# Patient Record
Sex: FEMALE | HISPANIC OR LATINO | Employment: UNEMPLOYED | URBAN - METROPOLITAN AREA
[De-identification: names, ages, dates, MRNs, and addresses within clinical notes are randomized per-mention and may not be internally consistent; named-entity substitution may affect disease eponyms.]

---

## 2020-01-17 ENCOUNTER — HOSPITAL ENCOUNTER (OUTPATIENT)
Facility: MEDICAL CENTER | Age: 40
End: 2020-01-17

## 2020-01-18 ENCOUNTER — HOSPITAL ENCOUNTER (EMERGENCY)
Facility: MEDICAL CENTER | Age: 40
End: 2020-01-18

## 2020-01-18 ENCOUNTER — HOSPITAL ENCOUNTER (OUTPATIENT)
Facility: MEDICAL CENTER | Age: 40
LOS: 1 days | End: 2020-01-19
Attending: HOSPITALIST | Admitting: HOSPITALIST

## 2020-01-18 ENCOUNTER — HOSPITAL ENCOUNTER (OUTPATIENT)
Dept: RADIOLOGY | Facility: MEDICAL CENTER | Age: 40
End: 2020-01-18

## 2020-01-18 ENCOUNTER — APPOINTMENT (OUTPATIENT)
Dept: RADIOLOGY | Facility: MEDICAL CENTER | Age: 40
End: 2020-01-18
Attending: HOSPITALIST

## 2020-01-18 DIAGNOSIS — I63.011 CEREBROVASCULAR ACCIDENT (CVA) DUE TO THROMBOSIS OF RIGHT VERTEBRAL ARTERY (HCC): ICD-10-CM

## 2020-01-18 DIAGNOSIS — G43.109 COMPLICATED MIGRAINE: ICD-10-CM

## 2020-01-18 PROBLEM — R53.1 LEFT-SIDED WEAKNESS: Status: ACTIVE | Noted: 2020-01-18

## 2020-01-18 LAB
EST. AVERAGE GLUCOSE BLD GHB EST-MCNC: 103 MG/DL
HBA1C MFR BLD: 5.2 % (ref 0–5.6)

## 2020-01-18 PROCEDURE — 36415 COLL VENOUS BLD VENIPUNCTURE: CPT

## 2020-01-18 PROCEDURE — 97162 PT EVAL MOD COMPLEX 30 MIN: CPT

## 2020-01-18 PROCEDURE — 700117 HCHG RX CONTRAST REV CODE 255: Performed by: HOSPITALIST

## 2020-01-18 PROCEDURE — 96366 THER/PROPH/DIAG IV INF ADDON: CPT

## 2020-01-18 PROCEDURE — 700111 HCHG RX REV CODE 636 W/ 250 OVERRIDE (IP): Performed by: PSYCHIATRY & NEUROLOGY

## 2020-01-18 PROCEDURE — 97165 OT EVAL LOW COMPLEX 30 MIN: CPT

## 2020-01-18 PROCEDURE — 83036 HEMOGLOBIN GLYCOSYLATED A1C: CPT

## 2020-01-18 PROCEDURE — 99244 OFF/OP CNSLTJ NEW/EST MOD 40: CPT | Performed by: PSYCHIATRY & NEUROLOGY

## 2020-01-18 PROCEDURE — 700111 HCHG RX REV CODE 636 W/ 250 OVERRIDE (IP): Performed by: HOSPITALIST

## 2020-01-18 PROCEDURE — 700102 HCHG RX REV CODE 250 W/ 637 OVERRIDE(OP): Performed by: PSYCHIATRY & NEUROLOGY

## 2020-01-18 PROCEDURE — 99358 PROLONG SERVICE W/O CONTACT: CPT | Performed by: HOSPITALIST

## 2020-01-18 PROCEDURE — 99220 PR INITIAL OBSERVATION CARE,LEVL III: CPT | Performed by: HOSPITALIST

## 2020-01-18 PROCEDURE — 92610 EVALUATE SWALLOWING FUNCTION: CPT

## 2020-01-18 PROCEDURE — 96375 TX/PRO/DX INJ NEW DRUG ADDON: CPT

## 2020-01-18 PROCEDURE — A9270 NON-COVERED ITEM OR SERVICE: HCPCS | Performed by: HOSPITALIST

## 2020-01-18 PROCEDURE — 93880 EXTRACRANIAL BILAT STUDY: CPT

## 2020-01-18 PROCEDURE — G0378 HOSPITAL OBSERVATION PER HR: HCPCS

## 2020-01-18 PROCEDURE — 70551 MRI BRAIN STEM W/O DYE: CPT

## 2020-01-18 PROCEDURE — 700102 HCHG RX REV CODE 250 W/ 637 OVERRIDE(OP): Performed by: HOSPITALIST

## 2020-01-18 PROCEDURE — 96365 THER/PROPH/DIAG IV INF INIT: CPT

## 2020-01-18 PROCEDURE — 70496 CT ANGIOGRAPHY HEAD: CPT

## 2020-01-18 PROCEDURE — 70498 CT ANGIOGRAPHY NECK: CPT

## 2020-01-18 PROCEDURE — A9270 NON-COVERED ITEM OR SERVICE: HCPCS | Performed by: PSYCHIATRY & NEUROLOGY

## 2020-01-18 RX ORDER — ATORVASTATIN CALCIUM 80 MG/1
80 TABLET, FILM COATED ORAL EVERY EVENING
Status: DISCONTINUED | OUTPATIENT
Start: 2020-01-18 | End: 2020-01-18

## 2020-01-18 RX ORDER — METOCLOPRAMIDE HYDROCHLORIDE 5 MG/ML
10 INJECTION INTRAMUSCULAR; INTRAVENOUS ONCE
Status: COMPLETED | OUTPATIENT
Start: 2020-01-18 | End: 2020-01-18

## 2020-01-18 RX ORDER — BISACODYL 10 MG
10 SUPPOSITORY, RECTAL RECTAL
Status: DISCONTINUED | OUTPATIENT
Start: 2020-01-18 | End: 2020-01-19 | Stop reason: HOSPADM

## 2020-01-18 RX ORDER — ASPIRIN 325 MG
325 TABLET ORAL DAILY
Status: DISCONTINUED | OUTPATIENT
Start: 2020-01-18 | End: 2020-01-19 | Stop reason: HOSPADM

## 2020-01-18 RX ORDER — PREDNISONE 50 MG/1
50 TABLET ORAL DAILY
Status: DISCONTINUED | OUTPATIENT
Start: 2020-01-19 | End: 2020-01-19

## 2020-01-18 RX ORDER — ASPIRIN 325 MG
325 TABLET ORAL DAILY
Status: DISCONTINUED | OUTPATIENT
Start: 2020-01-18 | End: 2020-01-18

## 2020-01-18 RX ORDER — DEXAMETHASONE SODIUM PHOSPHATE 4 MG/ML
10 INJECTION, SOLUTION INTRA-ARTICULAR; INTRALESIONAL; INTRAMUSCULAR; INTRAVENOUS; SOFT TISSUE ONCE
Status: COMPLETED | OUTPATIENT
Start: 2020-01-18 | End: 2020-01-18

## 2020-01-18 RX ORDER — ASPIRIN 81 MG/1
324 TABLET, CHEWABLE ORAL DAILY
Status: DISCONTINUED | OUTPATIENT
Start: 2020-01-18 | End: 2020-01-19 | Stop reason: HOSPADM

## 2020-01-18 RX ORDER — ASPIRIN 300 MG/1
300 SUPPOSITORY RECTAL DAILY
Status: DISCONTINUED | OUTPATIENT
Start: 2020-01-18 | End: 2020-01-18

## 2020-01-18 RX ORDER — LABETALOL HYDROCHLORIDE 5 MG/ML
10 INJECTION, SOLUTION INTRAVENOUS EVERY 4 HOURS PRN
Status: DISCONTINUED | OUTPATIENT
Start: 2020-01-18 | End: 2020-01-19 | Stop reason: HOSPADM

## 2020-01-18 RX ORDER — ATORVASTATIN CALCIUM 40 MG/1
40 TABLET, FILM COATED ORAL EVERY EVENING
Status: DISCONTINUED | OUTPATIENT
Start: 2020-01-18 | End: 2020-01-18

## 2020-01-18 RX ORDER — ASPIRIN 81 MG/1
324 TABLET, CHEWABLE ORAL DAILY
Status: DISCONTINUED | OUTPATIENT
Start: 2020-01-18 | End: 2020-01-18

## 2020-01-18 RX ORDER — AMOXICILLIN 250 MG
2 CAPSULE ORAL 2 TIMES DAILY
Status: DISCONTINUED | OUTPATIENT
Start: 2020-01-18 | End: 2020-01-19 | Stop reason: HOSPADM

## 2020-01-18 RX ORDER — ATORVASTATIN CALCIUM 40 MG/1
40 TABLET, FILM COATED ORAL EVERY EVENING
Status: DISCONTINUED | OUTPATIENT
Start: 2020-01-19 | End: 2020-01-19 | Stop reason: HOSPADM

## 2020-01-18 RX ORDER — HYDRALAZINE HYDROCHLORIDE 20 MG/ML
10 INJECTION INTRAMUSCULAR; INTRAVENOUS
Status: DISCONTINUED | OUTPATIENT
Start: 2020-01-18 | End: 2020-01-19 | Stop reason: HOSPADM

## 2020-01-18 RX ORDER — PREDNISONE 1 MG/1
1 TABLET ORAL DAILY
Status: DISCONTINUED | OUTPATIENT
Start: 2020-01-19 | End: 2020-01-18

## 2020-01-18 RX ORDER — MAGNESIUM SULFATE HEPTAHYDRATE 40 MG/ML
2 INJECTION, SOLUTION INTRAVENOUS ONCE
Status: COMPLETED | OUTPATIENT
Start: 2020-01-18 | End: 2020-01-18

## 2020-01-18 RX ORDER — AMITRIPTYLINE HYDROCHLORIDE 10 MG/1
10 TABLET, FILM COATED ORAL EVERY EVENING
Status: DISCONTINUED | OUTPATIENT
Start: 2020-01-18 | End: 2020-01-19 | Stop reason: HOSPADM

## 2020-01-18 RX ORDER — POLYETHYLENE GLYCOL 3350 17 G/17G
1 POWDER, FOR SOLUTION ORAL
Status: DISCONTINUED | OUTPATIENT
Start: 2020-01-18 | End: 2020-01-19 | Stop reason: HOSPADM

## 2020-01-18 RX ORDER — ASPIRIN 300 MG/1
300 SUPPOSITORY RECTAL DAILY
Status: DISCONTINUED | OUTPATIENT
Start: 2020-01-18 | End: 2020-01-19 | Stop reason: HOSPADM

## 2020-01-18 RX ORDER — KETOROLAC TROMETHAMINE 30 MG/ML
30 INJECTION, SOLUTION INTRAMUSCULAR; INTRAVENOUS EVERY 6 HOURS
Status: DISCONTINUED | OUTPATIENT
Start: 2020-01-18 | End: 2020-01-18

## 2020-01-18 RX ADMIN — METOCLOPRAMIDE 10 MG: 5 INJECTION, SOLUTION INTRAMUSCULAR; INTRAVENOUS at 11:27

## 2020-01-18 RX ADMIN — DEXAMETHASONE SODIUM PHOSPHATE 10 MG: 4 INJECTION, SOLUTION INTRA-ARTICULAR; INTRALESIONAL; INTRAMUSCULAR; INTRAVENOUS; SOFT TISSUE at 11:17

## 2020-01-18 RX ADMIN — ATORVASTATIN CALCIUM 80 MG: 80 TABLET, FILM COATED ORAL at 16:56

## 2020-01-18 RX ADMIN — AMITRIPTYLINE HYDROCHLORIDE 10 MG: 10 TABLET, FILM COATED ORAL at 19:34

## 2020-01-18 RX ADMIN — ASPIRIN 300 MG: 300 SUPPOSITORY RECTAL at 12:01

## 2020-01-18 RX ADMIN — IOHEXOL 80 ML: 350 INJECTION, SOLUTION INTRAVENOUS at 12:43

## 2020-01-18 RX ADMIN — PREDNISONE 60 MG: 50 TABLET ORAL at 19:34

## 2020-01-18 RX ADMIN — MAGNESIUM SULFATE 2 G: 2 INJECTION INTRAVENOUS at 11:31

## 2020-01-18 SDOH — HEALTH STABILITY: MENTAL HEALTH: HOW OFTEN DO YOU HAVE A DRINK CONTAINING ALCOHOL?: 2-4 TIMES A MONTH

## 2020-01-18 SDOH — HEALTH STABILITY: MENTAL HEALTH: HOW OFTEN DO YOU HAVE 6 OR MORE DRINKS ON ONE OCCASION?: NEVER

## 2020-01-18 SDOH — ECONOMIC STABILITY: TRANSPORTATION INSECURITY
IN THE PAST 12 MONTHS, HAS LACK OF TRANSPORTATION KEPT YOU FROM MEETINGS, WORK, OR FROM GETTING THINGS NEEDED FOR DAILY LIVING?: PATIENT DECLINED

## 2020-01-18 SDOH — HEALTH STABILITY: MENTAL HEALTH: HOW MANY STANDARD DRINKS CONTAINING ALCOHOL DO YOU HAVE ON A TYPICAL DAY?: 1 OR 2

## 2020-01-18 SDOH — ECONOMIC STABILITY: FOOD INSECURITY: WITHIN THE PAST 12 MONTHS, THE FOOD YOU BOUGHT JUST DIDN'T LAST AND YOU DIDN'T HAVE MONEY TO GET MORE.: PATIENT DECLINED

## 2020-01-18 SDOH — ECONOMIC STABILITY: FOOD INSECURITY: WITHIN THE PAST 12 MONTHS, YOU WORRIED THAT YOUR FOOD WOULD RUN OUT BEFORE YOU GOT MONEY TO BUY MORE.: PATIENT DECLINED

## 2020-01-18 SDOH — ECONOMIC STABILITY: TRANSPORTATION INSECURITY
IN THE PAST 12 MONTHS, HAS THE LACK OF TRANSPORTATION KEPT YOU FROM MEDICAL APPOINTMENTS OR FROM GETTING MEDICATIONS?: PATIENT DECLINED

## 2020-01-18 ASSESSMENT — ACTIVITIES OF DAILY LIVING (ADL): TOILETING: INDEPENDENT

## 2020-01-18 ASSESSMENT — COGNITIVE AND FUNCTIONAL STATUS - GENERAL
TOILETING: A LITTLE
CLIMB 3 TO 5 STEPS WITH RAILING: A LOT
SUGGESTED CMS G CODE MODIFIER DAILY ACTIVITY: CK
DRESSING REGULAR UPPER BODY CLOTHING: A LITTLE
WALKING IN HOSPITAL ROOM: A LITTLE
PERSONAL GROOMING: A LITTLE
DAILY ACTIVITIY SCORE: 19
WALKING IN HOSPITAL ROOM: A LITTLE
CLIMB 3 TO 5 STEPS WITH RAILING: A LOT
TURNING FROM BACK TO SIDE WHILE IN FLAT BAD: A LITTLE
DAILY ACTIVITIY SCORE: 19
SUGGESTED CMS G CODE MODIFIER MOBILITY: CK
MOVING FROM LYING ON BACK TO SITTING ON SIDE OF FLAT BED: A LITTLE
STANDING UP FROM CHAIR USING ARMS: A LITTLE
DRESSING REGULAR LOWER BODY CLOTHING: A LOT
HELP NEEDED FOR BATHING: A LITTLE
HELP NEEDED FOR BATHING: A LITTLE
MOVING TO AND FROM BED TO CHAIR: A LITTLE
MOVING FROM LYING ON BACK TO SITTING ON SIDE OF FLAT BED: A LITTLE
STANDING UP FROM CHAIR USING ARMS: A LITTLE
MOBILITY SCORE: 17
SUGGESTED CMS G CODE MODIFIER DAILY ACTIVITY: CK
DRESSING REGULAR LOWER BODY CLOTHING: A LITTLE
MOVING TO AND FROM BED TO CHAIR: A LITTLE
TOILETING: A LITTLE
DRESSING REGULAR UPPER BODY CLOTHING: A LITTLE
SUGGESTED CMS G CODE MODIFIER MOBILITY: CK
MOBILITY SCORE: 18

## 2020-01-18 ASSESSMENT — LIFESTYLE VARIABLES
CONSUMPTION TOTAL: NEGATIVE
HAVE PEOPLE ANNOYED YOU BY CRITICIZING YOUR DRINKING: NO
DOES PATIENT WANT TO STOP DRINKING: NO
TOTAL SCORE: 0
TOTAL SCORE: 0
EVER HAD A DRINK FIRST THING IN THE MORNING TO STEADY YOUR NERVES TO GET RID OF A HANGOVER: NO
ON A TYPICAL DAY WHEN YOU DRINK ALCOHOL HOW MANY DRINKS DO YOU HAVE: 1
EVER FELT BAD OR GUILTY ABOUT YOUR DRINKING: NO
TOTAL SCORE: 0
HOW MANY TIMES IN THE PAST YEAR HAVE YOU HAD 5 OR MORE DRINKS IN A DAY: 0
ALCOHOL_USE: YES
AVERAGE NUMBER OF DAYS PER WEEK YOU HAVE A DRINK CONTAINING ALCOHOL: 2
EVER_SMOKED: NEVER
HAVE YOU EVER FELT YOU SHOULD CUT DOWN ON YOUR DRINKING: NO

## 2020-01-18 ASSESSMENT — ENCOUNTER SYMPTOMS
WEAKNESS: 1
DEPRESSION: 0
NAUSEA: 0
PHOTOPHOBIA: 0
SHORTNESS OF BREATH: 0
DIZZINESS: 0
HEADACHES: 0
MYALGIAS: 0
WHEEZING: 0
DIARRHEA: 0
CHILLS: 0
TINGLING: 1
VOMITING: 0
FOCAL WEAKNESS: 1
SORE THROAT: 0
PALPITATIONS: 0
ABDOMINAL PAIN: 0
COUGH: 0
SENSORY CHANGE: 1
FEVER: 0

## 2020-01-18 ASSESSMENT — GAIT ASSESSMENTS
GAIT LEVEL OF ASSIST: MINIMAL ASSIST
ASSISTIVE DEVICE: HAND HELD ASSIST
DISTANCE (FEET): 15

## 2020-01-18 ASSESSMENT — COPD QUESTIONNAIRES
HAVE YOU SMOKED AT LEAST 100 CIGARETTES IN YOUR ENTIRE LIFE: NO/DON'T KNOW
DO YOU EVER COUGH UP ANY MUCUS OR PHLEGM?: NO/ONLY WITH OCCASIONAL COLDS OR INFECTIONS
COPD SCREENING SCORE: 0
DURING THE PAST 4 WEEKS HOW MUCH DID YOU FEEL SHORT OF BREATH: NONE/LITTLE OF THE TIME
IN THE PAST 12 MONTHS DO YOU DO LESS THAN YOU USED TO BECAUSE OF YOUR BREATHING PROBLEMS: DISAGREE/UNSURE

## 2020-01-18 ASSESSMENT — PATIENT HEALTH QUESTIONNAIRE - PHQ9
SUM OF ALL RESPONSES TO PHQ9 QUESTIONS 1 AND 2: 0
1. LITTLE INTEREST OR PLEASURE IN DOING THINGS: NOT AT ALL
2. FEELING DOWN, DEPRESSED, IRRITABLE, OR HOPELESS: NOT AT ALL

## 2020-01-18 NOTE — ASSESSMENT & PLAN NOTE
Patient has persistent left-sided weakness with decreased strength worse on the lower extremity when compared to the right.  She also has change in sensation on the left side of the body versus the right.  At this point, she is far outside of the window for TPA and her symptoms by her report are unchanged.  She will be admitted to the hospital and monitored closely on the neurology floor.  We will continue telemetry monitoring to assess for any cardiac dysrhythmias, check an MRI of the brain, echocardiogram and carotid ultrasound.  While she does have family history for CVA in her maternal grandmother, this was at an advanced age.  She may be at increased risk for hypercoagulability given her IUD which is hormone eluding.  Start on aspirin and a statin and will monitor blood pressure and control blood sugars.  Dr. Gutierres of neurology was consulted by the outside facility and we look forward to his recommendations.

## 2020-01-18 NOTE — THERAPY
"Speech Language Therapy Clinical Swallow Evaluation completed.  Functional Status: Pt seen this date for clinical swallow evaluation 2/2 CVA. Pt was awake, alert, followed directions and participated fully in evaluation. Oral mechanism examination completed, no gross oral motor deficits appreciated. Dentition intact. PO trials of ice, NTL, puree, soft, mixed, regular and thins via cup and straw assessed. Hyolaryngeal elevation palpated as complete, timely initiation of swallow appreciated and vocal quality remained clear throughout evaluation. Pt demonstrated functional mastication of solid with no oral residue appreciated. No s/sx of aspiration appreciated with any consistencies consumed. Recommend initiation of a regular/thin liquid diet with adherence to the following strategies: upright at 90* for PO, straws okay. SLP following.  Recommendations - Diet: Diet / Liquid Recommendation: (P) Thin Liquid, Regular                          Strategies: Head of Bed at 90 Degrees                          Medication Administration: Medication Administration : (P) Whole with Liquid Wash  Plan of Care: Will benefit from Speech Therapy 2 times per week  Post-Acute Therapy: Anticipate that the patient will have no further speech therapy needs after discharge from the hospital.        See \"Rehab Therapy-Acute\" Patient Summary Report for complete documentation.   "

## 2020-01-18 NOTE — CONSULTS
"Neurology Initial Consult H&P  Neurohospitalist Service, [facility]    Referring Physician: Elroy Mitchell M.D.    HPI: Lalita Alvarez is a 39 y.o. woman with hx of migraines who presents in status migrainosus.  As documented in EMR by Dr. Alejo: \"39 y.o. female who presented on 1/18/2020 in transfer from outside facility for suspected CVA...  She states that her symptoms began approximately 2 weeks ago when she woke up with right-sided neck pain that she had attributed to \"sleeping wrong\".  Since then, every time she turns her head to the left, she has had symptoms of vertigo with the room spinning around her.  She denies any trauma to the neck at that time.  [1/17/20] she had been in [facility] with her family and had gotten up at one point during the day to make lunch for the children when she realized that she could not move her left leg.  She also then noted that she had left arm weakness as well as a entire left side of the body numbness and tingling.  She did not seek immediate medical assistance at that time and she and her family attempted to drive back to [place] from [place].  However, en route her symptoms worsened and they stopped in [facility] where she was seen at an outside facility at that point, he had approximately been 2 hours since the start of her left-sided weakness. Work-up at [place], [place] showed chest x-ray without acute cardiopulmonary process, CT scan of the head showing no acute intracranial abnormality, urine drug screen negative, urine negative for leukocyte Estrace but positive for nitrites, sodium 140 potassium 3.8 chloride 108 CO2 24 BUN 15 creatinine 0.83 glucose 117 troponin 0 0.02 INR less than 0.9 WBC 11.7 hemoglobin 14.9 hematocrit 42.5 platelet 253.  Neurology [Dr. Peter Lugo] was consulted by the emergency room physician and no recommendations were made for TPA [due to low suspicion for stroke and low NIHSS with " "nondisabling deficits].  The patient had been recommended to be care flighted to [place] for continued treatment however, she declined and instead left AGAINST MEDICAL ADVICE and had private transfer to our facility where she was immediately admitted to the neurologic floor.\"    Today the patient feels that her symptoms are persisting.  Admits headache, throbbing, frontal, moderate to severe associated with photophobia and nausea.  Has completed MRI brain.  Admits a paresthesia of the left face and arm described as pins-and-needles; no decrease in sensation, but rather is \"different.\"  Denies weakness, visual changes, or vertigo nor lightheadedness.    Review of systems: In addition to what is detailed in the HPI above, (and scanned into the chart if and when applicable), all other systems reviewed and are negative.    Past Medical History:    has no past medical history of Anginal syndrome (HCC), Arrhythmia, Arthritis, Asthma, At risk for sleep apnea, Back pain, Blood clotting disorder (HCC), Bronchitis, Cancer (HCC), Cataract, Congestive heart failure (HCC), COPD (chronic obstructive pulmonary disease) (HCC), Diabetes (HCC), Dialysis patient (HCC), Disorder of thyroid, Fall, Glaucoma, Gynecological disorder, Heart murmur, Heart valve disease, Hemorrhagic disorder (HCC), Hypertension, Indigestion, Infectious disease, Jaundice, Myocardial infarct (HCC), Pacemaker, Pneumonia, Psychiatric problem, Renal disorder, Rheumatic fever, Seizure (HCC), Stroke (HCC), or Urinary incontinence.    FHx:  Family history is unknown by patient.    SHx:   reports that she has never smoked. She has never used smokeless tobacco. She reports current alcohol use. She reports that she does not use drugs.    Allergies:  No Known Allergies    Medications:    Current Facility-Administered Medications:   •  senna-docusate (PERICOLACE or SENOKOT S) 8.6-50 MG per tablet 2 Tab, 2 Tab, Oral, BID, Stopped at 01/18/20 0600 **AND** polyethylene " "glycol/lytes (MIRALAX) PACKET 1 Packet, 1 Packet, Oral, QDAY PRN **AND** magnesium hydroxide (MILK OF MAGNESIA) suspension 30 mL, 30 mL, Oral, QDAY PRN **AND** bisacodyl (DULCOLAX) suppository 10 mg, 10 mg, Rectal, QDAY PRN, Abril Alejo M.D.  •  Pharmacy consult request - Allow for permissive hypertension: SBP up to 220 mmHg/DBP up to 120 mmHg x 48 hours, , Other, PHARMACY TO DOSE, Abril Alejo M.D.  •  magnesium sulfate IVPB premix 2 g, 2 g, Intravenous, Once, Ran Motta M.D.  •  metoclopramide (REGLAN) injection 10 mg, 10 mg, Intravenous, Once, Ran Motta M.D.  •  dexamethasone (DECADRON) injection 10 mg, 10 mg, Intravenous, Once, Ran Motta M.D.  •  ketorolac (TORADOL) injection 30 mg, 30 mg, Intravenous, Q6HRS, Ran Motta M.D.    Physical Examination:     Vitals:    01/18/20 0226 01/18/20 0242 01/18/20 0250 01/18/20 0900   BP: 125/70   (!) 99/58   Pulse: 68   74   Resp: 18   18   Temp: 37.1 °C (98.8 °F)   36.6 °C (97.9 °F)   TempSrc: Temporal   Temporal   SpO2:    94%   Weight:  59.9 kg (132 lb 0.9 oz) 59.9 kg (132 lb 0.9 oz)    Height:  1.6 m (5' 3\")         General: Patient is awake and in no acute distress  Eyes: examination of optic disks not indicated at this time  CV: RRR    NEUROLOGICAL EXAM:     Mental status: Awake, alert and fully oriented, follows commands  Speech and language: speech is clear and fluent. The patient is able to name and repeat.  Cranial nerve exam: Pupils are equal, round and reactive to light bilaterally. Visual fields are full. Extraocular muscles are intact. Sensation in the face is notable for a paresthesia affecting the left face which splits midline. Face is symmetric. Hearing to finger rub equal. Palate elevates symmetrically. Shoulder shrug is full. Tongue is midline.  Motor exam: Strength is 5/5 in all extremities both distally and proximally.  There is drift without pronation of the left arm.  Tone is normal. No abnormal movements were seen on " exam.  Sensory exam: Positive paresthesia of the left arm and leg described as pins-and-needles  Deep tendon reflexes:  2+ and symmetric. Toes down-going bilaterally.  Coordination: no ataxia   Gait: Can stand unassisted from the bed    Objective Data:    Labs:  No results found for: PROTHROMBTM, INR   No results found for: WBC, RBC, HEMOGLOBIN, HEMATOCRIT, MCV, MCH, MCHC, MPV, NEUTSPOLYS, LYMPHOCYTES, MONOCYTES, EOSINOPHILS, BASOPHILS, HYPOCHROMIA, ANISOCYTOSIS   No results found for: SODIUM, POTASSIUM, CHLORIDE, CO2, GLUCOSE, BUN, CREATININE, BUNCREATRAT, GLOMRATE   No results found for: CHOLSTRLTOT, LDL, HDL, TRIGLYCERIDE    No results found for: ALKPHOSPHAT, ASTSGOT, ALTSGPT, TBILIRUBIN     Imaging/Testing:    I interpreted and/or reviewed the patient's neuroimaging    OUTSIDE IMAGES-DX CHEST   Final Result      OUTSIDE IMAGES-CT HEAD   Final Result      US-CAROTID DOPPLER BILAT    (Results Pending)   MR-BRAIN-W/O    (Results Pending)       Assessment and Plan:    Lalita Alvarez is a 39 y.o. female with relevant history of migraines presenting with status migrainosus for whom neurology was consulted to address left-sided paresthesias described as pins-and-needles affecting the face arm and leg.  Patient was not a candidate for thrombolytic therapy as suspicion for stroke was low and the patient had a low NIH stroke scale with nondisabling, purely sensory, deficits.      Differential includes complicated migraine especially given positive symptoms, and young age.  However, vascular infarction remains on the differential given focality.    Plan:    -Pending MRI brain  -Treat with 2 g of IV magnesium, 10 mg of Reglan IV, 10 mg of Decadron IV x1 now  -Migraine education, avoid triggers, stay hydrated, do skip meals, keep a headache diary  -Normotension  -Follow-up in headache clinic to consider the initiation of a preventative daily medication; I will place a referral    The evaluation of the patient, and recommended  management, was discussed with Dr. HUNTER Mitchell.    Ran Motta MD  Director, Comprehensive Stroke Center, [facility]  Neurohospitalist, [facility]  Clinical  of Neurology, [facility] [employer]  t) [phone] (f) [phone]

## 2020-01-18 NOTE — CONSULTS
"Called for direct transfer    38 y/o female w/o hx of medical illness presenting w/ L sided \"motor symptoms\" and \"sensory deficits\". NIHSS was not performed but I was told was \"~2\". She has a headache associated with her symptoms. Onset of \"~3.5 hours ago\" at the time of me being contacted. Given that symptoms are non-disabling and minor (per ERP exam) and there is possibility of other competing etiologies for symptoms I recommended against TPA. CT Head W/O CST was reportedly normal. She will be transferred here for MRI and further evaluation.        "

## 2020-01-18 NOTE — PROGRESS NOTES
Patient coming via: POV  ETA: 1/18/2020, approximately 0200 AM.    Informed by [facility] that patient left  Refused air transport & left facility AMA to travel via POV to [facility] instead.    Nursing to notify Direct Admit On-Call hospitalist & Neurologist upon patient arrival.  Nursing to sign/release held orders upon patient arrival.

## 2020-01-18 NOTE — THERAPY
"Physical Therapy Evaluation completed.   Bed Mobility:  Supine to Sit: Supervised  Transfers: Sit to Stand: Minimal Assist  Gait: Level Of Assist: Minimal Assist with hand held assist  x15 feet.     Plan of Care: Will benefit from Physical Therapy 4 times per week  Discharge Recommendations: Equipment: Will Continue to Assess for Equipment Needs. Post-acute therapy either Recommend post-acute placement for continued physical therapy services prior to discharge home, and patient can tolerate post-acute therapies at a 5x/week frequency, or Recommend outpatient physical therapy services to address higher level deficits depending on progress in acute care setting.    Pt presents to acute PT s/p TIA vs. complex migraine dx with subsequent L sided sensation impairment, gait abnormality, limited activity tolerance, and weakness L>R. Pt also presents with impaired motor control elmo through LLE. Pt is limited in functional mobility and endurance such as prolonged standing/walking because of below impairments. Pt will continue to benefit from skilled acute PT and recommending further therapy with post acute placement but may be able to D/C home depending on progress in acute care and help available from family.     See \"Rehab Therapy-Acute\" Patient Summary Report for complete documentation.     "

## 2020-01-18 NOTE — PROGRESS NOTES
Patient coming via: EMS, air  ETA: 2300  Nursing to notify Direct Admit On-Call hospitalist & Neurologist upon patient arrival.  Nursing to sign/release held orders upon patient arrival.

## 2020-01-18 NOTE — PROGRESS NOTES
Received Direct Admit transfer request from [place], [place].  Sending Physician: HUNTER Kumar MD  Specialist consulted: Dr. Haile  Diagnosis: CVA vs. TIA vs. Complex Migraine  Patient Accepted by: Dr. Luna

## 2020-01-18 NOTE — H&P
"Hospital Medicine History & Physical Note    Date of Service  1/18/2020    Primary Care Physician  No primary care provider on file.    Consultants  Dr. Gutierres, neurology    Code Status  Full    Chief Complaint  Transferred from outside facility for suspected CVA    History of Presenting Illness  39 y.o. female who presented on 1/18/2020 in transfer from outside facility for suspected CVA.  This is a pleasant 39-year-old female with no reported medical problems.  She states that her symptoms began approximately 2 weeks ago when she woke up with right-sided neck pain that she had attributed to \"sleeping wrong\".  Since then, every time she turns her head to the left, she has had symptoms of vertigo with the room spinning around her.  She denies any trauma to the neck at that time.  Yesterday she had been in [facility] with her family and had gotten up at one point during the day to make lunch for the children when she realized that she could not move her left leg.  She also then noted that she had left arm weakness as well as a entire left side of the body numbness and tingling.  She did not seek immediate medical assistance at that time and she and her family attempted to drive back to [place] from [place].  However, en route her symptoms worsened and they stopped in [facility] where she was seen at an outside facility at that point, he had approximately been 2 hours since the start of her left-sided weakness.    Work-up at [place], [place] showed chest x-ray without acute cardiopulmonary process, CT scan of the head showing no acute intracranial abnormality, urine drug screen negative, urine negative for leukocyte Estrace but positive for nitrites, sodium 140 potassium 3.8 chloride 108 CO2 24 BUN 15 creatinine 0.83 glucose 117 troponin 0 0.02 INR less than 0.9 WBC 11.7 hemoglobin 14.9 hematocrit 42.5 platelet 253.  Neurology was consulted by the emergency room physician and no recommendations " were made for TPA.  The patient had been recommended to be care flighted to [facility] for continued treatment however, she declined and instead left AGAINST MEDICAL ADVICE and had private transfer to our facility where she was immediately admitted to the neurologic floor.    Review of Systems  Review of Systems   Constitutional: Negative for chills and fever.   HENT: Negative for congestion and sore throat.    Eyes: Negative for photophobia.   Respiratory: Negative for cough, shortness of breath and wheezing.    Cardiovascular: Negative for chest pain and palpitations.   Gastrointestinal: Negative for abdominal pain, diarrhea, nausea and vomiting.   Genitourinary: Negative for dysuria.   Musculoskeletal: Negative for myalgias.   Skin: Negative.    Neurological: Positive for tingling, sensory change, focal weakness and weakness. Negative for dizziness and headaches.   Psychiatric/Behavioral: Negative for depression and suicidal ideas.       Past Medical History  Patient denies    Surgical History  Past Surgical History:   Procedure Laterality Date   • GYN SURGERY         Family History  Family History   Family history unknown: Yes   Paternal grandfather with MI, maternal grandmother with CVA in her 80s    Social History  Social History     Tobacco Use   • Smoking status: Never Smoker   • Smokeless tobacco: Never Used   Substance Use Topics   • Alcohol use: Yes     Frequency: 2-4 times a month     Drinks per session: 1 or 2     Binge frequency: Never   • Drug use: Never       Allergies  No Known Allergies    Medications  No current facility-administered medications on file prior to encounter.      No current outpatient medications on file prior to encounter.       Physical Exam  Hemodynamics  Temp (24hrs), Av.1 °C (98.8 °F), Min:37.1 °C (98.8 °F), Max:37.1 °C (98.8 °F)   Temperature: 37.1 °C (98.8 °F)  Pulse  Av  Min: 68  Max: 68    Blood Pressure: 125/70      Respiratory      Respiration: 18              Physical Exam   Constitutional: She is oriented to person, place, and time. No distress.   HENT:   Head: Normocephalic and atraumatic.   Right Ear: External ear normal.   Left Ear: External ear normal.   Eyes: EOM are normal. Right eye exhibits no discharge. Left eye exhibits no discharge.   Neck: Neck supple. No JVD present.   Cardiovascular: Normal rate, regular rhythm and normal heart sounds.   Pulmonary/Chest: Effort normal and breath sounds normal. No respiratory distress. She exhibits no tenderness.   Abdominal: Soft. Bowel sounds are normal. She exhibits no distension. There is no tenderness.   Musculoskeletal: Normal range of motion.         General: No edema.   Neurological: She is alert and oriented to person, place, and time. No cranial nerve deficit. She exhibits abnormal muscle tone.   Left-sided strength of both the upper and lower extremities is significantly decreased when compared to the right, approximately 3/5.  Patient also has decreased sensation on the left side of the face when compared to the right.  Ataxic gait. Otherwise the tongue is midline, there is no facial droop, no expressive aphasia, no dysarthria.   Skin: Skin is warm and dry. She is not diaphoretic. No erythema.   Psychiatric: She has a normal mood and affect. Her behavior is normal.   Nursing note and vitals reviewed.    Capillary refill less than 3 seconds, distal pulses intact    Laboratory:          No results for input(s): ALTSGPT, ASTSGOT, ALKPHOSPHAT, TBILIRUBIN, DBILIRUBIN, GAMMAGT, AMYLASE, LIPASE, ALB, PREALBUMIN, GLUCOSE in the last 72 hours.              No results found for: TROPONINI    Imaging  No results found.      Assessment/Plan:  Anticipate that patient will need more than 2 midnights for management of the discussed medical issues.    * Left-sided weakness  Assessment & Plan  Patient has persistent left-sided weakness with decreased strength worse on the lower extremity when compared to the right.  She also  has change in sensation on the left side of the body versus the right.  At this point, she is far outside of the window for TPA and her symptoms by her report are unchanged.  She will be admitted to the hospital and monitored closely on the neurology floor.  We will continue telemetry monitoring to assess for any cardiac dysrhythmias, check an MRI of the brain, echocardiogram and carotid ultrasound.  While she does have family history for CVA in her maternal grandmother, this was at an advanced age.  She may be at increased risk for hypercoagulability given her IUD which is hormone eluding.  Start on aspirin and a statin and will monitor blood pressure to allow for permissive hypertension and control blood sugars.  Dr. Gutierres of neurology was consulted by the outside facility and we look forward to his recommendations.      Prophylaxis: Sequential compression devices for DVT prophylaxis, no PPI indicated, bowel protocol as needed    spent a total of 35 minutes of non face to face time performing additional research, reviewing medical records from transferring facility, discussing plan of care with other healthcare providers. Start time: 02:40AM. End time: 03:15AM.

## 2020-01-18 NOTE — PROGRESS NOTES
Patient being transferred from [facility] for left-sided deficits including neuro and sensory.  Patient has an NIH SS score of 2.  Patient at this point was not given TPA at the recommendation of neurology.  Patient will be transferred to the [facility] for evaluation with echocardiogram MRI of the head.  Patient will need to be placed on aspirin and statin. No previous medical problems. Negative CT of head, labs are normal.

## 2020-01-18 NOTE — PROGRESS NOTES
Late Entry for 1/17/2020 @ 2345: Dr. Lugo called Transfer Center to check on status of patient's transfer; update provided.  He states he will see the patient in the morning.

## 2020-01-18 NOTE — PROGRESS NOTES
Brief Neurology Note    The patient's chart has been reviewed. Case discussed with Elroy Mitchell.    MRI brain 1/18/20:    IMPRESSION:     1.  Borderline low-lying cerebellar tonsils extending about 1 mm below the foramen magnum. This does not satisfy criteria for Chiari I malformation.  2.  Bandlike area of T2 and FLAIR signal abnormality with corresponding bright diffusion signal and restricted diffusion on the ADC map in the right lateral janice consistent with acute or recent subacute brainstem infarction.  3.  Abnormal right vertebral artery flow void and attenuated caliber and poor flow void signal in the proximal basilar artery. Given the patient's history of onset of symptoms associated with right-sided neck pain, a subacute right vertebral artery   dissection is suspected. Possible associated stenosis, thrombosis, or slow flow in the proximal basilar artery.    Diagnosis: Right lateral pontine infarct  Etiology: R vertebral artery dissection    Updated Recs:    - Neurology checks and vital signs per protocol  - Permissive HTN for 24-48 hours from onset of symptoms followed by goal BP <140 systolic. Long term BP goal (s/p 1-2 weeks from onset) is normotension  - obtain normoglycemia and avoid hypo- or hyper -natremia; aim for normothermia  - secondary stroke prevention therapy with ASA 325mg qd (There is equipoise in the literature for antiplatelet versus anticoagulation, but given size and territory of infarct, patient may benefit from antiplatelet therapy per CADISS Trial)  - high intensity statin per SPARCL Trial  - serum studies for stroke risk factors: lipid panel & hemoglobin A1C  - CTA head and neck in 3 months to assess healing of vertebral artery  - evaluate and treat with PT/OT/ST    Referral placed for stroke [facility].  No further recommendations or further studies from a neurological standpoint at this time. Please re-consult if you have further questions or there is a change in  status.    Ran Motta MD  Director, Comprehensive Stroke Center, [facility]  Neurohospitalist, [facility]  Clinical  of Neurology, [facility] [employer]  t) [phone] (f) [phone]

## 2020-01-18 NOTE — PROGRESS NOTES
Patient arrived to floor via wheelchair from the ER at ~0220.  Patient speaks primarily [place], but understands and speaks English proficiently, as well.  Patient was bale to transfer to bed with standby assistance, although she appeared unsteady.  She is alert and oriented to person, place, time, and situation.  She lives in [place], and was traveling back from [place] with her  and children when she experienced numbness and tingling to the left side of her face, and now reports N/T to entire left side of her body.  She denies pain, and is maintaining NPO status.  She has been oriented to room, unit, and upcoming tests.  20 G peripheral IV started in LAC with one attempt.  She is resting in bed at the time of this note with call light in reach.

## 2020-01-18 NOTE — THERAPY
"Occupational Therapy Evaluation completed.   Functional Status:  Pt presents to skilled OT services following L sided weakness, admitted for r/o TIA vs complex migraine. Pt performed bed mobility with supervision, LB dressing cga/min a, STS eob with min a, was only able to tolerate short ambulation w/ HHA d/t L sided ataxia more pronounced in LLE, likely exacerbated by decreased sensation. Pt c/o of \"pins and needle\" sensation of LLE and LUE, feels heaviness, moderately decreased hand  strength compared to Rt, shoulder ROM limited d/t IV placement. Pt will benefit from acute skilled OT services while in house and anticipate will be able to progress to d/c home once pt deficits is managed adequately and progress with therapy.   Plan of Care: Will benefit from Occupational Therapy 4 times per week  Discharge Recommendations:  Equipment: Front-Wheel Walker. Post-acute therapy Recommend post-acute placement for additional occupational therapy services prior to discharge home. Pt d/c disposition will be dependent on progress with acute therapy.        See \"Rehab Therapy-Acute\" Patient Summary Report for complete documentation.    "

## 2020-01-19 VITALS
DIASTOLIC BLOOD PRESSURE: 59 MMHG | OXYGEN SATURATION: 94 % | TEMPERATURE: 99.2 F | WEIGHT: 132.06 LBS | RESPIRATION RATE: 16 BRPM | HEART RATE: 82 BPM | BODY MASS INDEX: 23.4 KG/M2 | SYSTOLIC BLOOD PRESSURE: 102 MMHG | HEIGHT: 63 IN

## 2020-01-19 PROBLEM — I63.9 ACUTE CVA (CEREBROVASCULAR ACCIDENT) (HCC): Status: ACTIVE | Noted: 2020-01-19

## 2020-01-19 PROBLEM — I63.9 ACUTE CVA (CEREBROVASCULAR ACCIDENT) (HCC): Status: RESOLVED | Noted: 2020-01-19 | Resolved: 2020-01-19

## 2020-01-19 PROBLEM — G43.009 MIGRAINE WITHOUT AURA: Status: ACTIVE | Noted: 2020-01-18

## 2020-01-19 PROCEDURE — 700102 HCHG RX REV CODE 250 W/ 637 OVERRIDE(OP): Performed by: HOSPITALIST

## 2020-01-19 PROCEDURE — G0378 HOSPITAL OBSERVATION PER HR: HCPCS

## 2020-01-19 PROCEDURE — 96366 THER/PROPH/DIAG IV INF ADDON: CPT

## 2020-01-19 PROCEDURE — A9270 NON-COVERED ITEM OR SERVICE: HCPCS

## 2020-01-19 PROCEDURE — 700102 HCHG RX REV CODE 250 W/ 637 OVERRIDE(OP)

## 2020-01-19 PROCEDURE — 700111 HCHG RX REV CODE 636 W/ 250 OVERRIDE (IP)

## 2020-01-19 PROCEDURE — 700111 HCHG RX REV CODE 636 W/ 250 OVERRIDE (IP): Performed by: HOSPITALIST

## 2020-01-19 PROCEDURE — 99217 PR OBSERVATION CARE DISCHARGE: CPT | Performed by: HOSPITALIST

## 2020-01-19 PROCEDURE — A9270 NON-COVERED ITEM OR SERVICE: HCPCS | Performed by: HOSPITALIST

## 2020-01-19 RX ORDER — PREDNISONE 50 MG/1
50 TABLET ORAL ONCE
Status: COMPLETED | OUTPATIENT
Start: 2020-01-19 | End: 2020-01-19

## 2020-01-19 RX ORDER — PREDNISONE 10 MG/1
TABLET ORAL
Qty: 10 TAB | Refills: 0 | Status: SHIPPED | OUTPATIENT
Start: 2020-01-20

## 2020-01-19 RX ORDER — ATORVASTATIN CALCIUM 40 MG/1
40 TABLET, FILM COATED ORAL EVERY EVENING
Qty: 30 TAB | Refills: 3 | Status: SHIPPED | OUTPATIENT
Start: 2020-01-19

## 2020-01-19 RX ORDER — AMITRIPTYLINE HYDROCHLORIDE 10 MG/1
10 TABLET, FILM COATED ORAL EVERY EVENING
Qty: 30 TAB | Refills: 3 | Status: SHIPPED | OUTPATIENT
Start: 2020-01-19

## 2020-01-19 RX ORDER — ASPIRIN 325 MG
325 TABLET ORAL DAILY
Qty: 30 TAB | Refills: 3 | Status: SHIPPED | OUTPATIENT
Start: 2020-01-20

## 2020-01-19 RX ORDER — IBUPROFEN 800 MG/1
400 TABLET ORAL ONCE
Status: COMPLETED | OUTPATIENT
Start: 2020-01-19 | End: 2020-01-19

## 2020-01-19 RX ORDER — MAGNESIUM SULFATE HEPTAHYDRATE 40 MG/ML
2 INJECTION, SOLUTION INTRAVENOUS ONCE
Status: COMPLETED | OUTPATIENT
Start: 2020-01-19 | End: 2020-01-19

## 2020-01-19 RX ORDER — PREDNISONE 20 MG/1
40 TABLET ORAL ONCE
Status: DISCONTINUED | OUTPATIENT
Start: 2020-01-20 | End: 2020-01-19 | Stop reason: HOSPADM

## 2020-01-19 RX ADMIN — ASPIRIN 325 MG: 325 TABLET, FILM COATED ORAL at 05:18

## 2020-01-19 RX ADMIN — IBUPROFEN 400 MG: 800 TABLET, FILM COATED ORAL at 12:13

## 2020-01-19 RX ADMIN — PREDNISONE 50 MG: 50 TABLET ORAL at 12:01

## 2020-01-19 RX ADMIN — MAGNESIUM SULFATE 2 G: 2 INJECTION INTRAVENOUS at 12:00

## 2020-01-19 NOTE — DISCHARGE PLANNING
[facility] Transitional Care Coordination     Referral from:  Dr. Mitchell    Facesheet indicates: No medical provider is listed @ this time.  Please reach out to a PFA to screen for a potential provider.     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    D/C support: Spouse     Physiatry consultation pended per protocol.      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  No medical provider is listed @ this time. Anticipate [place] will be able to progress to d/c home once pt deficits is managed adequately and progress with therapy.  TCC remains monitoring.        Thank you for the referral.

## 2020-01-19 NOTE — CARE PLAN
Problem: Communication  Goal: The ability to communicate needs accurately and effectively will improve  Outcome: PROGRESSING AS EXPECTED   Patient encouraged to use call light to make needs known.   Problem: Safety  Goal: Will remain free from falls  Outcome: PROGRESSING AS EXPECTED   Bed alarm on, bed locked in lowest position, upper side rails up, call light and personal items within reach, non skids socks on.

## 2020-01-19 NOTE — DISCHARGE INSTRUCTIONS
Instrucciones de zbigniew e información según el neurólogo Dr. Ran Motta:       1. 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 Estado migrañoso  -Prednisona cónica (disminuyendo la prednisona en 10 mg cada día)  -Inicie Elavil 10 mg todas las noches.    3. Uso excesivo de medicamentos dolor de gianni. La historia adicional obtenida sugiere que el paciente ever 3 LIZA de Motrin / Advil todas las mañanas.  -El paciente tomará 2 pestañas por la mañana el día después del zbigniew y luego disminuirá a 1 pestaña por la mañana marium 2 días adicionales, y luego descontinuará por completo.  400 mg en la mañana del lunes 1/20  200 mg en la mañana reagan 1/21  200 mg en la mañana del miércoles 1/22 y luego deje de sayra motrin / ibuprfen por completo.      -Paciente educado que rio es un medicamento abortivo y no debe tomarse más de kristen o dos veces por semana.  La constelación de massiel de gianni del paciente se tratará semaj se detalla en el n. ° 2 anterior    La paciente está médicamente michele para viajar de regreso a alexander país de origen [place] el 19 de enero de 2020.  Recomienda fisioterapia.    In English:    Instructions and information from Neurologist Dr. Motta:    1.  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  Status migrainosus  -Prednisone taper(decreasing prednisone by 10mg each day)  -Start Elavil 10 mg nightly.       3.  Medication overuse headache.  Further history elicited suggest that the patient takes 3 Motrin/Advil NSAID every morning.  -Patient will take 2 tabs in the morning the day after discharge and then  decrease to 1 tab in the morning for an additional 2 days, and then discontinue completely  400 mg in Am Monday 1/20  200 mg in AM Tuesday 1/21  200 mg in AM Wednesday 1/22 then stop taking motrin/ibuprfen completely.    -Patient educated that this is an abortive medication and should be taken no more than once or twice per week  -Patient's constellation of headaches will be treated as detailed in #2 above     The patient is medically clear to travel back to her home country [place] January 19, 2020.  Recommend physical therapy.        Ictus isquémico  (Ischemic Stroke)  Un ictus isquémico (accidente cerebrovascular o ACV) es la muerte repentina de tejido cerebral que ocurre cuando el oxígeno no llega a kristen violetta del cerebro. Es kristen emergencia médica que debe tratarse de inmediato. Un ictus isquémico puede causar kristen pérdida permanente de la actividad cerebral. [place] puede causar problemas con el funcionamiento de diferentes partes del cuerpo.  CAUSAS  Esta afección es causada por la falta de oxígeno en kristen violetta del cerebro, que puede ser el resultado de lo siguiente:  · Un pequeño coágulo sanguíneo (émbolos) o kristen acumulación de placas en los vasos sanguíneos (ateroesclerosis) que bloqueen el torrente sanguíneo en el cerebro.  · Ritmo cardíaco anormal (fibrilación auricular).  · Kristen arteria obstruida o dañada en la gianni o el karmen.  FACTORES DE RIESGO  Hay ciertos factores que pueden hacer que sea más propenso a sufrir esta afección. Algunos de estos factores los puede controlar, semaj por ejemplo:  · Obesidad.  · Fumar cigarrillos.  · Scar anticonceptivos por vía oral, en especial si consume tabaco.  · Falta de actividad física.  · Consumo excesivo de bebidas alcohólicas.  · Consumo de drogas ilegales, especialmente cocaína y metanfetamina.  Otros factores de riesgo son los siguientes:  · Presión arterial elevada (hipertensión arterial).  · Colesterol elevado.  · Diabetes mellitus.  · Cardiopatía coronaria.  · Ser  afroamericano, norteamericano nativo, hispano o nativo de [place].  · Ser mayor de 60 años.  · Tener antecedentes familiares de ictus.  · Tener antecedentes de coágulos sanguíneos, ictus o ataques isquémicos transitorios (AIT).  · Anemia drepanocítica.  · Ser tammy con antecedentes de preeclampsia.  · Cefalea migrañosa.  · Apnea del sueño.  · Tener un ritmo cardíaco irregular, semaj fibrilación auricular.  · Tener enfermedades inflamatorias crónicas, semaj artritis reumatoide o lupus.  · Trastornos de coagulación (estado hipercoagulable).  SIGNOS Y SÍNTOMAS  Los síntomas de esta afección, por lo general, aparecen de forma repentina, o puede notarlos después de despertarse. Entre los síntomas repentinos se pueden incluir los siguientes:  · Debilidad o adormecimiento de la tonia, el brazo o la pierna, especialmente en un lado del cuerpo.  · Dificultad para caminar, o para  los brazos o las piernas.  · Pérdida del equilibrio o de la coordinación.  · Confusión.  · Habla arrastrada (disartria).  · Dificultad para hablar o comprender el lenguaje, o ambas (afasia).  · Cambios en la visión (semaj visión doble, visión borrosa o pérdida de la visión) en alvarez o ambos ojos.  · Mareos.  · Náuseas y vómitos.  · Dolor de gianni intenso sin causa aparente. Dolor de gianni que generalmente se describe semaj el peor dolor de gianni que haya sufrido.  En lo posible, tome nota de la hora exacta en la que se sintió normal por última vez y de la hora en que comenzaron los síntomas. Infórmele a alexander médico.  Si los síntomas van y vienen, esto podría ser un signo de ictus de advertencia o AIT. [place] ayuda de inmediato, incluso si se siente mejor.  DIAGNÓSTICO  Esta afección se puede diagnosticar en función de lo siguiente:  · Los síntomas, zane antecedentes médicos y un examen físico.  · Tomografía computarizada (TC) del cerebro.  · Resonancia magnética (RM).  · Angiografía por tomografía computarizada (TC). En esta prueba, se utiliza kristen  computadora para tomarle radiografías de las arterias. Pueden inyectarle un colorante en la karmen para observar el interior de los vasos sanguíneos con más claridad.  · Angiografía por resonancia magnética (RM). Se trata de un tipo de resonancia magnética que se usa para estudiar los vasos sanguíneos.  · Angiografía cerebral. En rio estudio se utilizan ivette X y un colorante para observar los vasos sanguíneos del cerebro y el karmen.  Ben vez deba consultar a un médico especialista en ictus. Puede consultar a un especialista en persona, por teléfono o mediante tecnología a distancia (telemedicina).  Se pueden realizar otros estudios para hallar la causa del ictus, que pueden incluir los siguientes:  · Electrocardiograma (ECG).  · Monitorización electrocardiográfica continua.  · Ecocardiograma.  · Ecografía de la carótida.  · Estudio de la circulación cerebral.  · Análisis de karmen.  · Estudio del sueño para verificar si tiene apnea del sueño.  TRATAMIENTO  El tratamiento de esta afección dependerá de la duración, la gravedad y la causa de los síntomas, y de la violetta del cerebro afectada. Es muy importante recibir tratamiento tras aparecer los primeros síntomas del ictus. Algunos tratamientos resultan más eficaces si se realizan en el plazo de las 3 a 6 horas del comienzo de los síntomas del ictus. Estos tratamientos pueden incluir los siguientes:  · Aspirina.  · Medicamentos para controlar la presión arterial.  · Un medicamento inyectable para disolver el coágulo sanguíneo (trombolítico).  · Tratamientos aplicados directamente en la arteria afectada para eliminar o disolver el coágulo sanguíneo.  Otras opciones de tratamiento son las siguientes:  · Oxígeno.  · Líquidos por vía IV.  · Medicamentos para diluir la karmen (anticoagulantes o inhibidores plaquetarios).  · Procedimientos para aumentar el flujo sanguíneo.  La administración de medicamentos y los cambios en la dieta pueden ayudar a tratar y controlar los  factores de riesgo de ictus, semaj la diabetes, el colesterol alto y la hipertensión arterial.  Después de un ictus, puede trabajar con fisioterapeutas, fonoaudiólogos o terapeutas ocupacionales para ayudarlo a recuperarse.  INSTRUCCIONES PARA EL CUIDADO EN EL HOGAR  Medicamentos   · [place] los medicamentos de venta leonela y los recetados solamente semaj se lo haya indicado el médico.  · Si le indicaron que tomara medicamentos para diluir la karmen, semaj aspirinas o anticoagulantes, tómelos exactamente semaj se lo haya indicado el médico.  ¨ El exceso de anticoagulantes puede causar hemorragias.  ¨ Si no ever la cantidad suficiente, no contará con la protección que necesita contra otro ictus y demás problemas.  · Conozca los efectos secundarios de sayra anticoagulantes. Al sayra rio tipo de medicamentos, asegúrese de lo siguiente:  ¨ Ejercer presión sobre las heridas por más tiempo que lo habitual.  ¨ Informe a alexander dentista y otros médicos que ever anticoagulantes antes de que le realicen alguna intervención quirúrgica que pueda causar hemorragias.  ¨ Evite realizar actividades que puedan causarle traumatismos o lesiones.  Comida y bebida   · Siga las indicaciones del médico acerca de la dieta.  · Consuma alimentos saludables.  · Si el ictus afectó alexander capacidad para tragar, es posible que necesite sayra medidas para no ahogarse, semaj las siguientes:  ¨ [place] de a porciones pequeñas.  ¨ [place] comidas blandas o en puré.  Seguridad   · Siga las indicaciones del equipo médico con respecto a la actividad física.  · Use un andador o un bastón semaj se lo haya indicado el médico.  · [place] medidas para crear un entorno seguro en alexander casa a fin de reducir el riesgo de caídas. [place] puede incluir lo siguiente:  ¨ Hacer que profesionales inspeccionen alexander casa.  ¨ Colocar barras para sostén en la habitación y el baño.  ¨ Colocar inodoros elevados y un asiento en la ducha semaj medidas de seguridad.  Instrucciones generales   · No consuma  ningún producto que contenga tabaco, lo que incluye cigarrillos, tabaco de mascar y cigarrillos electrónicos. Si necesita ayuda para dejar de fumar, consulte al médico.  · Limite el consumo de alcohol a no más de 1 medida por día si es tammy y no está embarazada, y 2 medidas por día si es hombre. [place] medida equivale a 12 onzas de cerveza, 5 onzas de vino o 1½ onzas de bebidas alcohólicas de zbigniew graduación.  · Si necesita ayuda para dejar de consumir drogas o alcohol, pídale al médico que le recomiende un programa o que lo derive a un especialista.  · Mantenga un estilo de joshua activo y saludable. [place] actividad física habitualmente semaj se lo haya indicado el médico.  · Acuda a todas las consultas de control semaj se lo haya indicado el médico, incluidas las consultas con todos los especialistas de alexander equipo médico. [place] es importante.  PREVENCIÓN  El riesgo de sufrir otro ictus puede disminuir al tratar, de manera adecuada, la hipertensión arterial, el colesterol alto, la diabetes, las cardiopatías coronarias, la apnea del sueño y la obesidad. También puede disminuir si murali de fumar, limita el consumo de alcohol y se mantiene físicamente activo.  El médico trabajará con usted para sayra medidas a fin de evitar las complicaciones del ictus a corto y a rah plazo.  SOLICITE ATENCIÓN MÉDICA DE INMEDIATO SI:  Tiene los siguientes síntomas:   · Tiene debilidad o adormecimiento súbito en el yesy, el brazo o la pierna, especialmente en un lado del cuerpo.  · Confusión súbita.  · Dificultad repentina para hablar o comprender el lenguaje, o ambas (afasia).  · Dificultad repentina para malaika con alvarez o ambos ojos.  · Dificultad repentina para caminar o para  los brazos o las piernas.  · Mareos repentinos.  · Pérdida repentina del equilibrio o de la coordinación.  · Dolor de gianni súbito e intenso sin causa aparente.  · Pérdida parcial o total del conocimiento.  · Convulsiones.  Cualquiera de estos síntomas puede  representar un problema grave y es kristen emergencia. No espere hasta que los síntomas desaparezcan. Solicite atención médica de inmediato. Comuníquese con el servicio de emergencias de alexander localidad (911 en los [place]). No conduzca por zane propios medios hasta el hospital.   Esta información no tiene semaj fin reemplazar el consejo del médico. Asegúrese de hacerle al médico cualquier pregunta que tenga.  Document Released: 09/27/2006 Document Revised: 01/08/2016 Document Reviewed: 03/15/2017  [employer] Interactive Patient Education © 2017 [employer]            Disección de la arteria vertebral  (Vertebral Artery Dissection)  La disección de la arteria vertebral es kristen ruptura en kristen arteria vertebral. Las arterias vertebrales son vasos sanguíneos importantes que se encuentran en la base del karmen. Estas arterias llevan la karmen desde el corazón al cerebro. Cuando kristen arteria se rompe, la karmen se acumula en el interior de las capas de la pared arterial. [place] puede derivar en la formación de un coágulo sanguíneo.  Esta afección aumenta el riesgo de ictus si no se diagnostica y se trata de inmediato. Es kristen causa frecuente de ictus en las personas de entre 30 y 45 años.  CAUSAS  Esta afección puede ser causada por lo siguiente:  · Kristen lesión en el karmen debido a un movimiento repentino o excesivo del karmen. La lesión puede ser leve o grave.  · El debilitamiento de las charles de los vasos sanguíneos. Las charles pueden romperse incluso cuando no se produce kristen lesión (disección espontánea).  FACTORES DE RIESGO  Puede ser más propenso a sufrir la disección espontánea de la arteria vertebral en los siguientes casos:  · Tiene hipertensión arterial.  · Tiene un trastorno jaquecoso.  · Tiene determinadas enfermedades hereditarias o algunos trastornos del tejido conjuntivo que debilitan los vasos sanguíneos.  · Tiene displasia fibromuscular.  SÍNTOMAS  Los síntomas por lo general aparecen a los pocos días de kristen  lesión, [place]hnaz a veces pueden no manifestarse marium semanas o años. Entre los síntomas se pueden incluir los siguientes:  · Dolor punzante y zelalem en la gianni, el karmen, los ojos o el yesy.  · Vértigo. Es la sensación de que usted o todo lo que lo rodea se mueven cuando en realidad eso no sucede.  · Mareos.  · Visión doble.  · Dificultad para hablar.  · Dificultad para tragar.  · Voz ronca.  · Pérdida de sensibilidad en el torso, las piernas o los brazos.  · Pérdida del gusto.  · Pérdida del equilibrio.  · Hipo.  · Náuseas y vómitos.  · Pérdida auditiva.  · Dolor de oído.  DIAGNÓSTICO  Esta afección se diagnostica mediante estudios, por ejemplo, los siguientes:  · Angiografía por tomografía computarizada (TC). En rio estudio se utiliza kristen computadora para sayra radiografías de las arterias vertebrales. Pueden inyectarle un colorante en la karmen para observar el interior de los vasos sanguíneos con más claridad.  · Angiografía por resonancia magnética (RM). Se trata de un tipo de resonancia magnética que se usa para estudiar los vasos sanguíneos.  · Angiografía cerebral. En rio estudio se utilizan ivette X y un colorante para observar los vasos sanguíneos del cerebro y el karmen.  · Ecografía Doppler. En rio estudio se utilizan ondas sonoras para observar las arterias vertebrales. También permitirá observar si hay un buen flujo de karmen arterial.  TRATAMIENTO  El tratamiento de esta afección depende de la causa de la disección de la arteria vertebral y del estado de clinton general. El objetivo más importante del tratamiento es prevenir un ictus. Si está teniendo un ictus, es importante que reciba tratamiento rápidamente. El tratamiento puede incluir lo siguiente:  · Anticoagulantes. Estos medicamentos ayudan a prevenir la formación de coágulos sanguíneos. Se pueden administrar nano por vía intravenosa (IV) y luego en comprimidos marium 3 a 6 meses.  · Procedimientos para ensanchar un vaso sanguíneo  estrecho (angioplastia) o colocar un tubo de ej (stent) dentro de un vaso sanguíneo para mantenerlo abierto.  · Cirugía para reparar la violetta. [place] debe realizarse en contadas ocasiones.  INSTRUCCIONES PARA EL CUIDADO EN EL HOGAR  · Trabaje con el médico para mantener la presión arterial bajo control. [place] puede implicar lo siguiente:  ¨ Hacer actividad física con regularidad semaj se lo haya indicado el médico. Hable con el médico antes de comenzar un nuevo tipo de actividad física.  ¨ Consumir kristen dieta cardiosaludable. [place] incluye limitar la cantidad de grasas no saludables y aumentar la ingesta de grasas saludables.  ¨ Reducir la cantidad de sal (sodio) que consume a menos de 1500 mg diarios.  ¨ Reducir el estrés al realizar las cosas que disfruta y evitar aquellas que le causan estrés.  · Evitar las actividades que lo ponen en riesgo de sufrir lesiones en el karmen, semaj los deportes de contacto.  · [place] los medicamentos de venta leonela y los recetados solamente semaj se lo haya indicado el médico.  · No consuma ningún producto que contenga tabaco, lo que incluye cigarrillos, tabaco de mascar y cigarrillos electrónicos. Si necesita ayuda para dejar de fumar, consulte al médico.  · Concurra a todas las visitas de control semaj se lo haya indicado el médico. [place] es importante.  SOLICITE ATENCIÓN MÉDICA DE INMEDIATO SI:  · Se siente débil o mareado.  · Siente súbitamente un dolor de gianni intenso que no tiene causa aparente.  · Nota cambios en la visión o en el habla.  · Tiene dificultad para respirar.  · Pierde el equilibrio o la coordinación.  · Siente debilidad o adormecimiento en la mano, el brazo, el yesy o la pierna.  · Siente dolor en el pecho.  · Tiene fiebre.  Estos síntomas pueden representar un problema grave que constituye kristen emergencia. No espere hasta que los síntomas desaparezcan. Solicite atención médica de inmediato. Comuníquese con el servicio de emergencias de alexander localidad (911 en los  [place]). No conduzca por zane propios medios hasta el hospital.   Esta información no tiene semaj fin reemplazar el consejo del médico. Asegúrese de hacerle al médico cualquier pregunta que tenga.  Document Released: 12/04/2013 Document Revised: 03/15/2017 Document Reviewed: 03/15/2017  [employer] Interactive Patient Education © 2017 [employer]      General Discharge Instructions    Discharged to home by car with relative. Discharged via wheelchair, hospital escort: Yes.  Special equipment needed: Not Applicable    Be sure to schedule a follow-up appointment with your primary care doctor or any specialists as instructed.     Discharge Plan:   Influenza Vaccine Indication: Patient Refuses    I understand that a diet low in cholesterol, fat, and sodium is recommended for good health. Unless I have been given specific instructions below for another diet, I accept this instruction as my diet prescription.   Other diet: Regular     Special Instructions:     Stroke/CVA/TIA/Hemorrhagic Ischemia Discharge Instructions  You have had a stroke. Your risk factors have been identified as follows:  Ethnicity - -Americans and Hispanics are at a higher risk  It is important that you reduce your risk factors to avoid another stroke in the future. Here are some general guidelines to follow:  · Eat healthy - avoid food high in fat.  · Get regular exercise.  · Maintain a healthy weight.  · Avoid smoking.  · Avoid alcohol and illegal drug use.  · Take your medications as directed.  For more information regarding risk factors, refer to pages 17-19 in your Stroke Patient Education Guide. Stroke Education Guide was given to patient.    Warning signs of a stroke include (which can also be found on page 3 of your Stroke Patient Education Guide):  · Sudden numbness of weakness of the face, arm or leg (especially on one side of the body).  · Sudden confusion, trouble speaking or understanding.  · Sudden trouble seeing in one or  both eyes.  · Sudden trouble walking, dizziness, loss of balance or coordination.  · Sudden severe headache with no known cause.  It is very important to get treatment quickly when a stroke occurs. If you experience any of the above warning signs, call [phone] immediately.     Some patients who have had a stroke will be going home on a blood thinner medication called Warfarin (Coumadin).  This medication requires very close monitoring and follow up.  This follow up can be provided by either your Primary Care Physician or by [facility] Outpatient Anticoagulation Service.  The Outpatient Anticoagulation Service is located at the [place] [facility] at [facility] ([facility]).  If you do not know when your follow up appointment is scheduled, call [phone] to verify your appointment time.      · Is patient discharged on Warfarin / Coumadin?   No     Depression / Suicide Risk    As you are discharged from this [facility], it is important to learn how to keep safe from harming yourself.    Recognize the warning signs:  · Abrupt changes in personality, positive or negative- including increase in energy   · Giving away possessions  · Change in eating patterns- significant weight changes-  positive or negative  · Change in sleeping patterns- unable to sleep or sleeping all the time   · Unwillingness or inability to communicate  · Depression  · Unusual sadness, discouragement and loneliness  · Talk of wanting to die  · Neglect of personal appearance   · Rebelliousness- reckless behavior  · Withdrawal from people/activities they love  · Confusion- inability to concentrate     If you or a loved one observes any of these behaviors or has concerns about self-harm, here's what you can do:  · Talk about it- your feelings and reasons for harming yourself  · Remove any means that you might use to hurt yourself (examples: pills, rope, extension cords, firearm)  · Get professional  help from the community (Mental Health, Substance Abuse, psychological counseling)  · Do not be alone:Call your Safe Contact- someone whom you trust who will be there for you.  · Call your local CRISIS HOTLINE 442-3150 or 080-571-9455  · Call your local Children's Mobile Crisis Response Team [place] (506) 621-7979 or www.CellControl  · Call the toll free National Suicide Prevention Hotlines   · National Suicide Prevention Lifeline 423-976-JMMO (8797)  · National Hope Line Network 800-SUICIDE (225-1911)

## 2020-01-19 NOTE — PROGRESS NOTES
Patient DC to home in stable condition - all DC instructions and prescriptions given. PIV removed and patient escorted off the unit by nursing staff.

## 2020-01-19 NOTE — PROGRESS NOTES
Monitor summary: sinus rhythm/sinus tach rate , MA .20, QRS .08, QT .38,  per strip from monitor room.

## 2020-01-19 NOTE — PROGRESS NOTES
Brief Neurology Note    The patient's chart has been reviewed. Case discussed with bedside RN.  The patient is accompanied at the bedside by her friends and family.    The following assessment and plan was discussed with them:    1.  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  Status migrainosus  -Prednisone taper, 10 mg tabs, start with 6 tabs on day 1 and decrease by 1 tab daily.  Dispense #21  -Start Elavil 10 mg nightly.  The indications and potential side effects of this medication were discussed with the patient in detail    3.  Medication overuse headache.  Further history elicited suggest that the patient takes 3 Motrin/Advil NSAID every morning.  -Patient will take 2 tabs in the morning for the next 2 days and then decrease to 1 tab in the morning for an additional 2 days, and then discontinue completely  -Patient educated that this is an abortive medication and should be taken no more than once or twice per week  -Patient's constellation of headaches will be treated as detailed in #2 above    The patient is medically clear to travel back to her home country [place] January 19, 2020.  Recommend physical therapy.    Ran Motta MD  Director, [facility], [facility]  Neurohospitalist, [facility]  Clinical  of Neurology, [facility] [employer]  t) [phone] (f) [phone]

## 2020-01-20 PROBLEM — I77.74 VERTEBRAL ARTERY DISSECTION (HCC): Status: ACTIVE | Noted: 2020-01-20

## 2020-01-20 PROBLEM — I77.74 VERTEBRAL ARTERY DISSECTION (HCC): Status: RESOLVED | Noted: 2020-01-20 | Resolved: 2020-01-20

## 2020-01-20 NOTE — DISCHARGE SUMMARY
"Discharge Summary    CHIEF COMPLAINT ON ADMISSION  No chief complaint on file.      Reason for Admission  TIA     Admission Date  1/18/2020    CODE STATUS  Prior    HPI & HOSPITAL COURSE    As per dr cobian h+p    39 y.o. female who presented on 1/18/2020 in transfer from outside facility for suspected CVA.  This is a pleasant 39-year-old female with no reported medical problems.  She states that her symptoms began approximately 2 weeks ago when she woke up with right-sided neck pain that she had attributed to \"sleeping wrong\".  Since then, every time she turns her head to the left, she has had symptoms of vertigo with the room spinning around her.  She denies any trauma to the neck at that time.  Yesterday she had been in [facility] with her family and had gotten up at one point during the day to make lunch for the children when she realized that she could not move her left leg.  She also then noted that she had left arm weakness as well as a entire left side of the body numbness and tingling.  She did not seek immediate medical assistance at that time and she and her family attempted to drive back to [place] from [place].  However, en route her symptoms worsened and they stopped in [facility] where she was seen at an outside facility at that point, he had approximately been 2 hours since the start of her left-sided weakness.     Work-up at [place], [place] showed chest x-ray without acute cardiopulmonary process, CT scan of the head showing no acute intracranial abnormality, urine drug screen negative, urine negative for leukocyte Estrace but positive for nitrites, sodium 140 potassium 3.8 chloride 108 CO2 24 BUN 15 creatinine 0.83 glucose 117 troponin 0 0.02 INR less than 0.9 WBC 11.7 hemoglobin 14.9 hematocrit 42.5 platelet 253.  Neurology was consulted by the emergency room physician and no recommendations were made for TPA.  The patient had been recommended to be care flighted to [place] for " continued treatment however, she declined and instead left AGAINST MEDICAL ADVICE and had private transfer to our facility where she was immediately admitted to the neurologic floor.    Her work-up in our hospital included an MRI of the brain that showed evidence of a brainstem infarct possible related to a recent vertebral artery dissection    Patient was given aspirin and a statin    Patient did have a migraine with Angy was treated with Elavil and steroid taper    She improved on every day on the day of discharge she had no focal deficits and she was stable for discharge to follow-up in outpatient setting       Therefore, she is discharged in good and stable condition to home with close outpatient follow-up.    The patient recovered much more quickly than anticipated on admission.    Discharge Date  1/19/2020    FOLLOW UP ITEMS POST DISCHARGE      DISCHARGE DIAGNOSES  Principal Problem:    Migraine without aura POA: Yes  Resolved Problems:    Acute CVA (cerebrovascular accident) (HCC) POA: Yes    Vertebral artery dissection (HCC) POA: Yes      FOLLOW UP  No future appointments.  No follow-up provider specified.    MEDICATIONS ON DISCHARGE     Medication List      START taking these medications      Instructions   amitriptyline 10 MG Tabs  Commonly known as:  ELAVIL   Take 1 Tab by mouth every evening.  Dose:  10 mg     aspirin 325 MG Tabs  Commonly known as:  ASA   Take 1 Tab by mouth every day.  Dose:  325 mg     atorvastatin 40 MG Tabs  Commonly known as:  LIPITOR   Take 1 Tab by mouth every evening.  Dose:  40 mg     predniSONE 10 MG Tabs  Commonly known as:  DELTASONE   4 tabs on 1/20  3 tabs po on 1/21  2 tabs po on 1/22  1 tab po on 1/23 then stop            Allergies  No Known Allergies    DIET  No orders of the defined types were placed in this encounter.      ACTIVITY  As tolerated.  Weight bearing as tolerated    CONSULTATIONS  Dr lema neuro    PROCEDURES  HISTORY/REASON FOR EXAM:  Onset of  right-sided neck pain 2 weeks ago with vertigo associated with head turning. Left-sided weakness, numbness. Symptoms for 2 weeks. Vertigo left leg and left arm weakness and left side of the body numbness and tingling.     TECHNIQUE/EXAM DESCRIPTION:  MRI of the brain without contrast.     T1 sagittal, T2 fast spin-echo axial, T1 coronal, FLAIR coronal, Diffusion weighted and Apparent Diffusion Coefficient (ADC map) axial images were obtained of the whole brain. Additional FLAIR axial images were obtained.     The study was performed on a Dwayne 3.0 Jenny MRI scanner.     COMPARISON:  Head CT 1/17/2020     FINDINGS:  The calvariae are unremarkable.  There are no extra-axial fluid collections.     The ventricular system and basal cisterns are within normal limits.     There are no areas of abnormal signal in the cerebral hemispheres.     There are no mass effects or shift of midline structures.  There are no hemorrhagic lesions.  The diffusion weighted axial images show no evidence of acute cerebral infarction.     The brainstem shows a peculiar bandlike area of mildly increased FLAIR and T2 signal traversing the right lateral aspect of the janice. There is corresponding mildly bright diffusion signal with corresponding hypointensity on the ADC map (FLAIR axial image   42, series 703, T2 axial image 12, series 501, diffusion-weighted axial image 96, series 604, ADC map axial image 100, series 603). This is most consistent with an acute or recent subacute brainstem infarction. There is no hemorrhagic signal associated   with this lesion. The cerebellar hemispheres are unremarkable.     The cerebellar tonsils are borderline low-lying extending about 1 mm below the foramen magnum (T1 weighted sagittal 3-D TFE image 95, series 301). This does not satisfy criteria for Chiari I malformation.     Vascular flow voids show poor visualization of the right vertebral artery V4 segment at the foramen magnum with intermediate T2  signal (T2 axial image 6, series 501).  There is also abnormal heterogeneous increased T2 signal along the expected course of   the right vertebral artery at the level of the distal V3 segment (T2 axial image 3, series 501). The left vertebral artery flow void appears maintained. Markedly hypoplastic proximal basilar artery (T2 axial image 10, series 501). The proximal one third   basilar artery shows poor flow void signal (T2 axial image 11, series 501). The distal basilar artery resumes a more normal caliber (T2 axial images 12-14, series 501).     The paranasal sinuses in the field of view are unremarkable.     The mastoids show minimal reticular opacities toward the right mastoid tip.     IMPRESSION:     1.  Borderline low-lying cerebellar tonsils extending about 1 mm below the foramen magnum. This does not satisfy criteria for Chiari I malformation.  2.  Bandlike area of T2 and FLAIR signal abnormality with corresponding bright diffusion signal and restricted diffusion on the ADC map in the right lateral janice consistent with acute or recent subacute brainstem infarction.  3.  Abnormal right vertebral artery flow void and attenuated caliber and poor flow void signal in the proximal basilar artery. Given the patient's history of onset of symptoms associated with right-sided neck pain, a subacute right vertebral artery   dissection is suspected. Possible associated stenosis, thrombosis, or slow flow in the proximal basilar artery.  4. The case was discussed (call report) with DR. KEE WALKER on duty for ANDREAS MOORE at 11:35 AM 1/18/2020.   IR Documentation Timeline (1/20/2020 10:19:32 to 1/20/2020 10:19:32)     Reading Provider Reading Date   Lio Hermosillo M.D. Jan 18, 2020   Signing Provider Signing Date Signing Time   Lio Hermosillo M.D. Jan 18, 2020 11:37 AM   Order Detail Report     MR-BRAIN-W/O (Order #[id number]) on 1/18/20   Order-Level Documents:     There are no order-level documents.    Encounter-Level Documents:     There are no encounter-level documents.   Order-Level Documents for Parent Order:     There are no order-level documents for parent order.   MR-BRAIN-W/O [[id number]]     Electronically signed by: Abril Alejo M.D. on 01/18/20 0319 Status: Completed   Ordering user: Abril Alejo M.D. 01/18/20 0319 Ordering provider: Abril Alejo M.D.   Authorized by: Abril Alejo M.D. Ordered during: Admission (Discharged) on 01/18/2020   Frequency: Once 01/18/20 0318 - 1  occurrence Indications of use: TIA, initial exam   Questionnaire     Question Answer   Is the patient pregnant? Unknown   Comments to Radiology Okay for patient to be off telemetry monitoring for MRI   Does the patient require anesthesia or sedation? No Sedation   Order comments: MRI IS NOT COMPATIBLE WITH PACEMAKERS OR INTRACRANIAL ANEURYSM CLIPS. 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     External Results Report     Open External Results Report         LABORATORY  No results found for: SODIUM, POTASSIUM, CHLORIDE, CO2, GLUCOSE, BUN, CREATININE, GLOMRATE     No results found for: WBC, HEMOGLOBIN, HEMATOCRIT, PLATELETCT     Total time of the discharge process exceeds 38  minutes.